# Patient Record
Sex: MALE | Race: BLACK OR AFRICAN AMERICAN | Employment: UNEMPLOYED | URBAN - METROPOLITAN AREA
[De-identification: names, ages, dates, MRNs, and addresses within clinical notes are randomized per-mention and may not be internally consistent; named-entity substitution may affect disease eponyms.]

---

## 2024-03-09 ENCOUNTER — HOSPITAL ENCOUNTER (EMERGENCY)
Facility: HOSPITAL | Age: 39
Discharge: HOME | End: 2024-03-09
Attending: EMERGENCY MEDICINE
Payer: MEDICAID

## 2024-03-09 VITALS
RESPIRATION RATE: 18 BRPM | OXYGEN SATURATION: 97 % | TEMPERATURE: 98 F | DIASTOLIC BLOOD PRESSURE: 110 MMHG | HEART RATE: 79 BPM | SYSTOLIC BLOOD PRESSURE: 161 MMHG | WEIGHT: 170 LBS

## 2024-03-09 DIAGNOSIS — F32.A DEPRESSION, UNSPECIFIED DEPRESSION TYPE: Primary | ICD-10-CM

## 2024-03-09 LAB
ALBUMIN SERPL-MCNC: 4.9 G/DL (ref 3.2–4.8)
ALBUMIN/GLOB SERPL: 1.4 {RATIO} (ref 1–2)
ALP LIVER SERPL-CCNC: 72 U/L
ALT SERPL-CCNC: 21 U/L
ANION GAP SERPL CALC-SCNC: 5 MMOL/L (ref 0–18)
APAP SERPL-MCNC: <0.2 MG/DL (ref 1–2)
AST SERPL-CCNC: 25 U/L (ref ?–34)
ATRIAL RATE: 71 BPM
BASOPHILS # BLD AUTO: 0.04 X10(3) UL (ref 0–0.2)
BASOPHILS NFR BLD AUTO: 0.5 %
BILIRUB SERPL-MCNC: 0.9 MG/DL (ref 0.3–1.2)
BUN BLD-MCNC: 10 MG/DL (ref 9–23)
BUN/CREAT SERPL: 9.3 (ref 10–20)
CALCIUM BLD-MCNC: 9.9 MG/DL (ref 8.7–10.4)
CHLORIDE SERPL-SCNC: 108 MMOL/L (ref 98–112)
CO2 SERPL-SCNC: 26 MMOL/L (ref 21–32)
CREAT BLD-MCNC: 1.07 MG/DL
DEPRECATED RDW RBC AUTO: 47.8 FL (ref 35.1–46.3)
EGFRCR SERPLBLD CKD-EPI 2021: 91 ML/MIN/1.73M2 (ref 60–?)
EOSINOPHIL # BLD AUTO: 0.07 X10(3) UL (ref 0–0.7)
EOSINOPHIL NFR BLD AUTO: 0.9 %
ERYTHROCYTE [DISTWIDTH] IN BLOOD BY AUTOMATED COUNT: 14.6 % (ref 11–15)
ETHANOL SERPL-MCNC: 25 MG/DL (ref ?–3)
GLOBULIN PLAS-MCNC: 3.5 G/DL (ref 2.8–4.4)
GLUCOSE BLD-MCNC: 65 MG/DL (ref 70–99)
HCT VFR BLD AUTO: 46.4 %
HGB BLD-MCNC: 16.3 G/DL
IMM GRANULOCYTES # BLD AUTO: 0.02 X10(3) UL (ref 0–1)
IMM GRANULOCYTES NFR BLD: 0.3 %
LYMPHOCYTES # BLD AUTO: 2.47 X10(3) UL (ref 1–4)
LYMPHOCYTES NFR BLD AUTO: 33.2 %
MCH RBC QN AUTO: 31.3 PG (ref 26–34)
MCHC RBC AUTO-ENTMCNC: 35.1 G/DL (ref 31–37)
MCV RBC AUTO: 89.2 FL
MONOCYTES # BLD AUTO: 0.46 X10(3) UL (ref 0.1–1)
MONOCYTES NFR BLD AUTO: 6.2 %
NEUTROPHILS # BLD AUTO: 4.38 X10 (3) UL (ref 1.5–7.7)
NEUTROPHILS # BLD AUTO: 4.38 X10(3) UL (ref 1.5–7.7)
NEUTROPHILS NFR BLD AUTO: 58.9 %
OSMOLALITY SERPL CALC.SUM OF ELEC: 285 MOSM/KG (ref 275–295)
P AXIS: 68 DEGREES
P-R INTERVAL: 166 MS
PLATELET # BLD AUTO: 235 10(3)UL (ref 150–450)
POTASSIUM SERPL-SCNC: 4.1 MMOL/L (ref 3.5–5.1)
PROT SERPL-MCNC: 8.4 G/DL (ref 5.7–8.2)
Q-T INTERVAL: 380 MS
QRS DURATION: 90 MS
QTC CALCULATION (BEZET): 412 MS
R AXIS: 60 DEGREES
RBC # BLD AUTO: 5.2 X10(6)UL
SALICYLATES SERPL-MCNC: <3 MG/DL (ref 3–20)
SARS-COV-2 RNA RESP QL NAA+PROBE: NOT DETECTED
SODIUM SERPL-SCNC: 139 MMOL/L (ref 136–145)
T AXIS: 54 DEGREES
VENTRICULAR RATE: 71 BPM
WBC # BLD AUTO: 7.4 X10(3) UL (ref 4–11)

## 2024-03-09 PROCEDURE — 36415 COLL VENOUS BLD VENIPUNCTURE: CPT

## 2024-03-09 PROCEDURE — 85025 COMPLETE CBC W/AUTO DIFF WBC: CPT | Performed by: EMERGENCY MEDICINE

## 2024-03-09 PROCEDURE — 93010 ELECTROCARDIOGRAM REPORT: CPT

## 2024-03-09 PROCEDURE — 82077 ASSAY SPEC XCP UR&BREATH IA: CPT | Performed by: EMERGENCY MEDICINE

## 2024-03-09 PROCEDURE — 93005 ELECTROCARDIOGRAM TRACING: CPT

## 2024-03-09 PROCEDURE — 99285 EMERGENCY DEPT VISIT HI MDM: CPT

## 2024-03-09 PROCEDURE — 80143 DRUG ASSAY ACETAMINOPHEN: CPT | Performed by: EMERGENCY MEDICINE

## 2024-03-09 PROCEDURE — 80053 COMPREHEN METABOLIC PANEL: CPT | Performed by: EMERGENCY MEDICINE

## 2024-03-09 PROCEDURE — 80179 DRUG ASSAY SALICYLATE: CPT | Performed by: EMERGENCY MEDICINE

## 2024-03-09 PROCEDURE — 99284 EMERGENCY DEPT VISIT MOD MDM: CPT

## 2024-03-09 RX ORDER — ACETAMINOPHEN 325 MG/1
650 TABLET ORAL ONCE
Status: COMPLETED | OUTPATIENT
Start: 2024-03-09 | End: 2024-03-09

## 2024-03-09 NOTE — ED NOTES
Received pt a/ox4, clear speech, nad, no resp distress, ambulatory with steady gait. Pt tearful and withdrawn upon assessment    Here with c/o SI and depression for the past few months. Reports he has \"a lot going on\" which triggered his depression and thoughts.    Pt denies HI, SI. Reports marijuana and etoh use.     Pt reports hx of depression, states he dealt with it by sleep. Denies hx of psych issues or inpatient treatment.    Labs obtained via venipuncture  No urine provided at this time    Placed on continuous pulse ox and nibp. Pt hypertensive but denies hx. Md notified    Sitter at bs. Security also at bs    Will continue to monitor closely

## 2024-03-09 NOTE — ED NOTES
Writer unable to discontinue order for  due to pt being discharged. Pt declined to meet with ES.    n/a

## 2024-03-09 NOTE — ED PROVIDER NOTES
Patient Seen in: [facility] Emergency Department    History     Chief Complaint   Patient presents with    Eval-P       HPI    38-year-old male here with 3 months of passive suicidal ideation, denies active plan or intent.  Quite withdrawn limiting history    History reviewed. History reviewed. No pertinent past medical history.    History reviewed. History reviewed. No pertinent surgical history.      Medications :  (Not in a hospital admission)       No family history on file.    Smoking Status:   Social History     Socioeconomic History    Marital status: Single   Tobacco Use    Smoking status: Never    Smokeless tobacco: Never       Constitutional and vital signs reviewed.      Social History and Family History elements reviewed from today, pertinent positives to the presenting problem noted.    Physical Exam     ED Triage Vitals [03/09/24 1152]   BP (!) 161/110   Pulse 79   Resp 18   Temp 98.3 °F (36.8 °C)   Temp src Temporal   SpO2 97 %   O2 Device None (Room air)       All measures to prevent infection transmission during my interaction with the patient were taken. The patient was already wearing a droplet mask on my arrival to the room. Personal protective equipment was worn throughout the duration of the exam.  Handwashing was performed prior to and after the exam.  Stethoscope and any equipment used during my examination was cleaned with super sani-cloth germicidal wipes following the exam.     Physical Exam    General: NAD  Head: Normocephalic and atraumatic.  Mouth/Throat/Ears/Nose: No hoarseness of voice  Eyes: Conjunctivae and EOM are normal.  Neck: Normal range of motion. Supple.   Cardiovascular: Normal rate  Respiratory/Chest: No tachypnea.  Gastrointestinal: Nondistended  Musculoskeletal:No swelling or deformity.   Neurological: Alert and appropriate.   Skin: Skin is warm and dry. No pallor.  Psychiatric: Withdrawn behavior, flat affect.      ED Course        Labs Reviewed   COMP METABOLIC  PANEL (14) - Abnormal; Notable for the following components:       Result Value    Glucose 65 (*)     BUN/CREA Ratio 9.3 (*)     Total Protein 8.4 (*)     Albumin 4.9 (*)     All other components within normal limits   ETHYL ALCOHOL - Abnormal; Notable for the following components:    Ethyl Alcohol 25 (*)     All other components within normal limits   ACETAMINOPHEN (TYLENOL), S - Abnormal; Notable for the following components:    Acetaminophen <0.2 (*)     All other components within normal limits   SALICYLATE, SERUM - Abnormal; Notable for the following components:    Salicylate <3.0 (*)     All other components within normal limits   CBC W/ DIFFERENTIAL - Abnormal; Notable for the following components:    RDW-SD 47.8 (*)     All other components within normal limits   SARS-COV-2 BY PCR (GENEXPERT) - Normal   CBC WITH DIFFERENTIAL WITH PLATELET    Narrative:     The following orders were created for panel order CBC With Differential With Platelet.                  Procedure                               Abnormality         Status                                     ---------                               -----------         ------                                     CBC W/ DIFFERENTIAL[[id number]]          Abnormal            Final result                                                 Please view results for these tests on the individual orders.   DRUG SCREEN 7 W/OUT CONFIRMATION, URINE     EKG    Rate, intervals and axes as noted on EKG Report.  Rate: 71  Rhythm: Sinus Rhythm  Reading: No STEMI.  This is my interpretation.           As Interpreted by me    Imaging Results Available and Reviewed while in ED: No results found.  ED Medications Administered:   Medications   acetaminophen (Tylenol) tab 650 mg (650 mg Oral Given 3/9/24 1458)         MDM     Vitals:    03/09/24 1152   BP: (!) 161/110   Pulse: 79   Resp: 18   Temp: 98.3 °F (36.8 °C)   TempSrc: Temporal   SpO2: 97%   Weight: 77.1 kg     *I personally  reviewed and interpreted all ED vitals.    Pulse Ox: 97%, Room air, Normal         Medical Decision Making      Differential Diagnosis/ Diagnostic Considerations: Suicidal ideation, depression    Complicating Factors: The patient already has does not have a problem list on file. to contribute to the complexity of this ED evaluation.    I reviewed prior chart records including : No prior EMR records available for review.  Patient here with passive suicidal ideation, quite withdrawn during my discussion.   More information was obtained by the crisis  to whom the patient also denied suicide intent.  Patient was able to agree to a safety plan, collateral information was also obtained from the brother who is comfortable with discharge plan and having patient follow-up in the outpatient setting with mental health services.  Labs reviewed and unremarkable for acute findings on my interpretation      Discharged in stable condition.  Patient is comfortable with the plan.       Disposition and Plan     Clinical Impression:  1. Depression, unspecified depression type        Disposition:  Discharge    Follow-up:  [facility] [address] [place] [address]  Schedule an appointment as soon as possible for a visit in 1 day(s)        Medications Prescribed:  There are no discharge medications for this patient.

## 2024-03-09 NOTE — BH LEVEL OF CARE ASSESSMENT
Crisis Evaluation Assessment    Marty Sanderson YOB: 1985   Age 38 year old MRN [id number]   Location [facility] EMERGENCY DEPARTMENT Attending No att. providers found      Patient's legal sex: male  Patient identifies as: male  Patient's birth sex: male  Preferred pronouns: his him    Date of Service: 3/9/2024    Referral Source:  Referral Source  Where was crisis eval performed?: On-site  Referral Source: Friend/Relative  Referral Source Info: self    Reason for Crisis Evaluation   Patient brought himself to the ED via private vehicle reporting thoughts of SI for past 2 months, precipitated by anxiety and depression. Patient denies intent and has no prior SI attempts.     Patient presents as tearful and withdrawn. Patient initially guarded but eventually began sharing some of the stressors. Patient states he feels hopeless and helpless. Patient has had a lot of losses and changes over the past 6 months. Patient reports he is homeless and was evicted from his apartment in December. 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 Patient reports he stayed with his children's mother and though they were not together they parented children together. Patient reports she moved to [place] with their children. 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 Patient endorses drinking and smoking as much as he is able to afford. Patient denies specific SI plan and intent. Patient states he would not do this to his children. Patient denies SI history or any mental health treatment.     Patient is receptive to outpatient resources and willing to provide writer with a friend's contact number to verify safety. Patient's friend, Alonso, states he knows pt has had a difficult time but does not have concerns for his safety if he were to be discharged. Friend agreed  to check in with pt on a daily basis as part of safety plan. Patient and writer completed safety plan together.     Collateral  Alonso (friend) [phone]    Suicide Crisis Syndrome:  Suicide Crisis Syndrome  Do you feel trapped with no good options left?: Yes  Are you overwhelmed, or have you lost control by negative thoughts filling your head? : Yes    Suicide Risk Screening:  Source of information for CSSR: Patient  In what setting is the screener performed?: in person  1. Have you wished you were dead or wished you could go to sleep and not wake up? (past 30 days): Yes  2. Have you actually had any thoughts of killing yourself? (past 30 days): Yes  3. Have you been thinking about how you might kill yourself? (past 30 days): Yes (pt reports he doesn't know how he would harm himself, has had various thoughts but has always ruled them out.)  4. Have you had these thoughts and had some intention of acting on them? (past 30 days): Yes (patient states he becomes afraid of his thoughts but denies intent.)  5a. Have you started to work out or worked out the details of how to kill yourself? (past 30 days): No  5b. Do you intend to carry out this plan? (past 30 days): No  6. Have you ever done anything, started to do anything, or prepared to do anything to end your life? (lifetime): No     Score - BH OV: 7 - High Risk     Suicide Risk Assessments:  Suicidal Thoughts, Plan and Intent (this information to be used in conjunction with CSSR-S Suicide Screening)  Describe thoughts, ideation and intent:: patient endorses hopelessness, helplessness.  Frequency: How many times have you had these thoughts?: Daily or almost daily  Duration: When you have the thoughts, how long do they last?: 1-4 hours/a lot of time  Controllability: Could/can you stop thinking about killing yourself or wanting to die if you want to?: Can control thoughts with some difficulty  Identify Risk Factors  Do you have access to lethal methods to attempt  suicide?: No  Clinical Status:: Hopelessness;Major depressive episode;Substance abuse or dependence  Activating Events/Recent Stressors:: Significant negative event(s) (legal, financial, relationship, etc.);Pending incarceration or homelessness;Current or pending isolation or feeling alone  Identify Protective Factors  Internal: Ability to cope with stress;Fear of death or dying due to pain and suffering;Identifies reasons for living  External: Belief that suicide is immoral, high spirituality;Responsibility to family or others, living with family  Risk Stratification  Risk Level: High     Non-Suicidal Self-Injury:   denies    Risk to Others  Denies HI    Access to Means:  Access to Means  Has access to means to attempt suicide, self-injure, harm others, or damage property?: No  Discussion of Removal of Access to Means: pt denies specific plan and intent.  Access to Firearm/Weapon: No  Discussion of Removal of Firearm/Weapon: n/a  Do you have a firearm owner identification (FOID) card?: No  Collateral information on access to means/firearms/weapons: friend    Protective Factors:   Children, denies specific plan and intent.     Review of Psychiatric Systems:  See above.     Substance Use:  Etoh and marijuana daily use.     Functional Achievement:   See above.     Ability to Care for Self::   Denies concerns.     Current Treatment and Treatment History:  none    School/Work Performance:  none    Relevant Social History:  Patient is homeless, not employed and has three children that reside with their mother in [place].     Memo and Complex (as applicable):     Current Medical (as applicable):     EDP Assessment (as applicable):  IBW Calculations  Weight: 170 lb     Abuse Assessment:  Abuse Assessment  Physical Abuse: Denies  Verbal Abuse: Denies  Sexual Abuse: Denies  Neglect: Denies  Does anyone say or do something to you that makes you feel unsafe?: No  Have You Ever Been Harmed by a Partner/Caregiver?: No  Health  Concerns r/t Abuse: No  Possible Abuse Reportable to:: Not appropriate for reporting to authorities    Mental Status Exam:   General Appearance  Characteristics: Appropriate clothing  Eye Contact: Direct;Fleeting  Psychomotor Behavior  Gait/Movement: Steady;Normal  Abnormal movements: Tremors  Posture: Relaxed  Rate of Movement: Normal  Mood and Affect  Mood or Feelings: Sadness;Hopeless;Depressed  Appropriateness of Affect: Congruent to mood  Range of Affect: Normal  Stability of Affect: Stable  Attitude toward staff: Co-operative  Speech  Rate of Speech: Appropriate  Flow of Speech: Appropriate  Intensity of Volume: Ordinary  Clarity: Clear  Cognition  Concentration: Unimpaired  Memory: Recent memory intact;Remote memory intact  Orientation Level: Oriented X4  Insight: Poor  Fair/poor insight as evidenced by: evidenced by symptoms and behaviors  Judgment: Fair  Fair/poor judgment as evidenced by: evidenced by symptoms and behaviors  Thought Patterns  Clarity/Relevance: Coherent;Logical;Relevant to topic  Flow: Organized  Content: Ordinary  Level of Consciousness: Alert  Level of Consciousness: Alert  Behavior  Exhibited behavior: Participated      Disposition:  PHP     Rationale for Treatment Recommendation:   Patient is a 38 year old male who was brought to the ED for SI. Patient endorses hopelessness but denies specific SI plan or intent. Patient receptive to referrals. Patient completed safety plan with writer.     Level of Care Recommendations  Consulted with: Dr Davis  Level of Care Recommendation: Partial Hospitalization  Program: Adult;Dual  Location:  ([facility])  PHP Start Date:  (TBD)  Reason for Unit Assigned: age and symptoms  Partial Criteria: Moderate to severe impairment in role performance;Inablility to manage intensity of symptoms;Potential for non-compliance;High-risk or unsupportive environment;Inadequate coping skills/needs to acquire;Self-destructive behaviors;Substance use regularly impairs  functioning  Refused Treatment: Yes  Can an [facility] Staff Call You in 24-72 Hours to discuss care?: Yes  Navigator to call the patient: Mental Health  Refused Treatment Reason: Other (pt flying to [place] to stay with children)  Education Provided: Call 911 in an Emergency;[facility] Crisis Line Number;Advised to call if condition worsens;Advised to call with questions  Sign-In  Patient Verbalized Understanding: Yes      Diagnoses with F-Codes:  Primary Psychiatric Diagnosis  Major Depressive Disorder, Single Episode, Severe without Psychosis.      Secondary Psychiatric Diagnoses  Alcohol Use Disorder.     Pervasive Diagnoses (as applicable)  N/a  Pertinent Non-Psychiatric Diagnoses  N/a        Melania GOVEA LCSW

## 2024-03-09 NOTE — ED INITIAL ASSESSMENT (HPI)
Patient reports thought of suicide x 2 mo's, thinks precipitated by anxiety and depression. Denies HI, denies plan, denies prior psych history of medication use.

## 2024-03-14 ENCOUNTER — TELEPHONE (OUTPATIENT)
Age: 39
End: 2024-03-14